# Patient Record
Sex: MALE | Race: AMERICAN INDIAN OR ALASKA NATIVE
[De-identification: names, ages, dates, MRNs, and addresses within clinical notes are randomized per-mention and may not be internally consistent; named-entity substitution may affect disease eponyms.]

---

## 2018-05-29 ENCOUNTER — HOSPITAL ENCOUNTER (OUTPATIENT)
Dept: HOSPITAL 56 - MW.ED | Age: 58
Setting detail: OBSERVATION
LOS: 1 days | Discharge: NURSING, REHAB OR LONG-TERM CARE | End: 2018-05-30
Attending: INTERNAL MEDICINE | Admitting: INTERNAL MEDICINE
Payer: COMMERCIAL

## 2018-05-29 DIAGNOSIS — I20.0: Primary | ICD-10-CM

## 2018-05-29 DIAGNOSIS — Z79.899: ICD-10-CM

## 2018-05-29 DIAGNOSIS — Z79.01: ICD-10-CM

## 2018-05-29 DIAGNOSIS — F41.9: ICD-10-CM

## 2018-05-29 DIAGNOSIS — I25.2: ICD-10-CM

## 2018-05-29 DIAGNOSIS — I25.10: ICD-10-CM

## 2018-05-29 DIAGNOSIS — Z79.82: ICD-10-CM

## 2018-05-29 DIAGNOSIS — I10: ICD-10-CM

## 2018-05-29 DIAGNOSIS — I16.0: ICD-10-CM

## 2018-05-29 DIAGNOSIS — E78.00: ICD-10-CM

## 2018-05-29 DIAGNOSIS — Z95.5: ICD-10-CM

## 2018-05-29 DIAGNOSIS — Z87.891: ICD-10-CM

## 2018-05-29 DIAGNOSIS — E78.5: ICD-10-CM

## 2018-05-29 DIAGNOSIS — E03.9: ICD-10-CM

## 2018-05-29 DIAGNOSIS — E66.9: ICD-10-CM

## 2018-05-29 LAB
CHLORIDE SERPL-SCNC: 107 MMOL/L (ref 98–107)
SODIUM SERPL-SCNC: 142 MMOL/L (ref 136–148)

## 2018-05-29 PROCEDURE — G0378 HOSPITAL OBSERVATION PER HR: HCPCS

## 2018-05-29 RX ADMIN — NITROGLYCERIN PRN MG: 0.4 TABLET SUBLINGUAL at 10:35

## 2018-05-29 RX ADMIN — NITROGLYCERIN ONE: 0.4 TABLET SUBLINGUAL at 10:43

## 2018-05-29 RX ADMIN — NITROGLYCERIN ONE MG: 0.4 TABLET SUBLINGUAL at 10:35

## 2018-05-29 RX ADMIN — NITROGLYCERIN PRN MG: 0.4 TABLET SUBLINGUAL at 10:44

## 2018-05-29 RX ADMIN — NITROGLYCERIN PRN MG: 0.4 TABLET SUBLINGUAL at 10:46

## 2018-05-29 NOTE — EDM.PDOC
ED HPI GENERAL MEDICAL PROBLEM





- General


Chief Complaint: Cardiovascular Problem


Stated Complaint: AMBULANCE


Time Seen by Provider: 05/29/18 10:37


Source of Information: Reports: Patient


History Limitations: Reports: No Limitations





- History of Present Illness


INITIAL COMMENTS - FREE TEXT/NARRATIVE: 


HISTORY AND PHYSICAL:


[]58-year-old male presents per EMS with concerns over diaphoresis weakness 

legs felt heavy the last 2 days now he had chest pressure/mental status





History of Present Illness:


[]Pleasant alert and oriented gentleman he is answering questions states that 

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


He did take his aspirin today and states that the ambulance crew gave him for 5 

tablets of aspirin to chew


Patient was diaphoretic on admission


Patient denies being diabetic he denies any difficulty chewing or swallowing 

denies have any cold fever or flulike symptoms


Denies having any diarrhea





Review of Systems:


As per history of present illness and below otherwise all 


systems reviewed and negative.  





Past medical history:


As per history of present illness and as reviewed below


otherwise noncontributory.





Surgical history:


As per history of present illness and as reviewed below


otherwise noncontributory.





Social history:


No reported history of drug or alcohol abuse.





Family history:


As per history of present illness and as reviewed below


otherwise noncontributory.





Physical exam:


Alert and oriented male answering questions appropriately in full sentences 

without shortness of breath. Nontoxic in appearance, is anxious.


HEENT: Atraumatic, normocehpalic, pupils reactive, negative for conjunctival 

pallor or scleral icterus, mucous membranes moist, throat clear, neck supple, 

nontender, trachea midline.  Panic membranes without erythema. Oral mucosa is 

moist.


Lungs: Clear to auscultation, breath sounds equal bilaterally, chest non 

tender.  


Heart: S1S2, regular, negative for clicks, rubs, or JVD.


Abdomen: Soft, nondistended, nontender.  Negative for masses or 

hepatossplenmegaly. Negative for costovertebral tenderness.


Pelvis: Stable nontender.


Genitourinary: Deferred.


Rectal: Deferred


Extremities: Atraumatic, negative for cords or calf pain.  


Neurovascular unremarkable.


Neuro:  Awake, alert, oriented.  Cranial nerves II through XII


unremarkable.  Cerebellum unremarkable.  Motor and sensory unremarkable 

throughout.  Exam nonfocal.  


Patient again is reporting feeling tingly having difficulty breathing. The 

pressure has elevated 180s over 110 second Nitrostat given and patient's blood 

pressure has reduced . Have place Nitrobid on his chest. Second EKG has 

improved slightly sinus rhythm 1 PVCs right bundle branch block.


Blood pressure continues to rise. Labetalol was ordered and reponse was blood 

pressure with diastolic 90's.


Have discussed this case with Dr. Allen who is agreeable for observation on 

telemetry.





Diagnostics:


[]CBC CMP troponin pace EKG oxygen chest x-ray





Therapeutics:


[]Nitrostat 0.4 sublingual X3


Nitrobid


Labetalol 20 IV


Telemetry





Impression:


[]Hypertension





Plan:


[]Refer to observation





Definitive disposition and diagnosis as appropriate pending


reevaluation and review of above.  





Onset: Today, Sudden


Location: Reports: Chest


Quality: Reports: Pressure


Severity: Moderate


Improves with: Reports: None


Worsens with: Reports: None


Associated Symptoms: Reports: Diaphoresis, Shortness of Breath, Weakness





- Related Data


 Allergies











Allergy/AdvReac Type Severity Reaction Status Date / Time


 


No Known Allergies Allergy   Verified 05/29/18 10:42














ED ROS GENERAL





- Review of Systems


Review Of Systems: ROS reveals no pertinent complaints other than HPI.





ED EXAM, GENERAL





- Physical Exam


Exam: See Below (See dictation)





Course





- Vital Signs


Last Recorded V/S: 


 Last Vital Signs











Temp  37.7 C   05/29/18 10:29


 


Pulse  85   05/29/18 11:33


 


Resp  16   05/29/18 11:33


 


BP  156/106 H  05/29/18 11:33


 


Pulse Ox  98   05/29/18 11:33














- Orders/Labs/Meds


Orders: 


 Active Orders 24 hr











 Category Date Time Status


 


 Patient Status [ADT] Stat ADT  05/29/18 12:08 Active


 


 Cardiac Monitoring [RC] .AS DIRECTED Care  05/29/18 10:35 Active


 


 EKG Documentation Completion [RC] STAT Care  05/29/18 10:35 Active


 


 EKG Documentation Completion [RC] STAT Care  05/29/18 10:47 Active


 


 Oxygen Therapy [RC] ASDIRECTED Care  05/29/18 10:35 Active


 


 Pulse Oximetry [RC] ASDIRECTED Care  05/29/18 10:35 Active


 


 Telemetry Monitoring [Cardiac Monitoring] [RC] .AS Care  05/29/18 12:09 Active





 DIRECTED   


 


 UA W/MICROSCOPIC [URIN] Stat Lab  05/29/18 10:32 Ordered


 


 Sodium Chloride 0.9% [Saline Flush] Med  05/29/18 10:35 Active





 10 ml FLUSH ASDIRECTED PRN   


 


 Sodium Chloride 0.9% [Saline Flush] Med  05/29/18 10:35 Active





 2.5 ml FLUSH ASDIRECTED PRN   


 


 Saline Lock Insert [OM.PC] Stat Oth  05/29/18 10:35 Ordered








 Medication Orders





Sodium Chloride (Saline Flush)  10 ml FLUSH ASDIRECTED PRN


   PRN Reason: Keep Vein Open


   Last Admin: 05/29/18 10:38  Dose: 10 ml


Sodium Chloride (Saline Flush)  2.5 ml FLUSH ASDIRECTED PRN


   PRN Reason: Keep Vein Open


   Last Admin: 05/29/18 10:38  Dose: 2.5 ml








Labs: 


 Laboratory Tests











  05/29/18 05/29/18 05/29/18 Range/Units





  10:32 10:45 10:45 


 


WBC   6.07   (4.0-11.0)  K/uL


 


RBC   4.91   (4.50-5.90)  M/uL


 


Hgb   15.0   (13.0-17.0)  g/dL


 


Hct   42.4   (38.0-50.0)  %


 


MCV   86.4   (80.0-98.0)  fL


 


MCH   30.5   (27.0-32.0)  pg


 


MCHC   35.4   (31.0-37.0)  g/dL


 


RDW Std Deviation   39.6   (28.0-62.0)  fl


 


RDW Coeff of Martha   13   (11.0-15.0)  %


 


Plt Count   188   (150-400)  K/uL


 


MPV   9.70   (7.40-12.00)  fL


 


Neut % (Auto)   73.4   (48.0-80.0)  %


 


Lymph % (Auto)   19.3   (16.0-40.0)  %


 


Mono % (Auto)   6.9   (0.0-15.0)  %


 


Eos % (Auto)   0.2   (0.0-7.0)  %


 


Baso % (Auto)   0.2   (0.0-1.5)  %


 


Neut # (Auto)   4.5   (1.4-5.7)  K/uL


 


Lymph # (Auto)   1.2   (0.6-2.4)  K/uL


 


Mono # (Auto)   0.4   (0.0-0.8)  K/uL


 


Eos # (Auto)   0.0   (0.0-0.7)  K/uL


 


Baso # (Auto)   0.0   (0.0-0.1)  K/uL


 


Nucleated RBC %   0.0   /100WBC


 


Nucleated RBCs #   0   K/uL


 


INR    1.18  


 


Sodium     (136-148)  mmol/L


 


Potassium     (3.5-5.1)  mmol/L


 


Chloride     ()  mmol/L


 


Carbon Dioxide     (21.0-32.0)  mmol/L


 


BUN     (7.0-18.0)  mg/dL


 


Creatinine     (0.8-1.3)  mg/dL


 


Est Cr Clr Drug Dosing     mL/min


 


Estimated GFR (MDRD)     ml/min


 


Glucose     ()  mg/dL


 


Calcium     (8.5-10.1)  mg/dL


 


Total Bilirubin     (0.2-1.0)  mg/dL


 


AST     (15-37)  IU/L


 


ALT     (14-63)  IU/L


 


Alkaline Phosphatase     ()  U/L


 


Troponin I     (0.000-0.056)  ng/mL


 


Total Protein     (6.4-8.2)  g/dL


 


Albumin     (3.4-5.0)  g/dL


 


Globulin     (2.0-3.5)  g/dL


 


Albumin/Globulin Ratio     (1.3-2.8)  


 


Amylase     ()  U/L


 


Lipase     ()  U/L


 


Urine Color  YELLOW    


 


Urine Appearance  CLEAR    


 


Urine pH  5.5    (5.0-8.0)  


 


Ur Specific Gravity  1.020    (1.001-1.035)  


 


Urine Protein  NEGATIVE    (NEGATIVE)  mg/dL


 


Urine Glucose (UA)  NEGATIVE    (NEGATIVE)  mg/dL


 


Urine Ketones  NEGATIVE    (NEGATIVE)  mg/dL


 


Urine Occult Blood  TRACE-INTACT    (NEGATIVE)  


 


Urine Nitrite  NEGATIVE    (NEGATIVE)  


 


Urine Bilirubin  NEGATIVE    (NEGATIVE)  


 


Urine Urobilinogen  0.2    (<2.0)  EU/dL


 


Ur Leukocyte Esterase  NEGATIVE    (NEGATIVE)  


 


Urine RBC  0-2    (0-2/HPF)  


 


Urine WBC  0-1    (0-5/HPF)  


 


Ur Epithelial Cells  RARE    (NONE-FEW)  


 


Amorphous Sediment  NOT SEEN    (NEGATIVE)  


 


Urine Bacteria  NOT SEEN    (NEGATIVE)  


 


Urine Mucus  NOT SEEN    (NONE-MOD)  














  05/29/18 Range/Units





  10:45 


 


WBC   (4.0-11.0)  K/uL


 


RBC   (4.50-5.90)  M/uL


 


Hgb   (13.0-17.0)  g/dL


 


Hct   (38.0-50.0)  %


 


MCV   (80.0-98.0)  fL


 


MCH   (27.0-32.0)  pg


 


MCHC   (31.0-37.0)  g/dL


 


RDW Std Deviation   (28.0-62.0)  fl


 


RDW Coeff of Martha   (11.0-15.0)  %


 


Plt Count   (150-400)  K/uL


 


MPV   (7.40-12.00)  fL


 


Neut % (Auto)   (48.0-80.0)  %


 


Lymph % (Auto)   (16.0-40.0)  %


 


Mono % (Auto)   (0.0-15.0)  %


 


Eos % (Auto)   (0.0-7.0)  %


 


Baso % (Auto)   (0.0-1.5)  %


 


Neut # (Auto)   (1.4-5.7)  K/uL


 


Lymph # (Auto)   (0.6-2.4)  K/uL


 


Mono # (Auto)   (0.0-0.8)  K/uL


 


Eos # (Auto)   (0.0-0.7)  K/uL


 


Baso # (Auto)   (0.0-0.1)  K/uL


 


Nucleated RBC %   /100WBC


 


Nucleated RBCs #   K/uL


 


INR   


 


Sodium  142  (136-148)  mmol/L


 


Potassium  4.2  (3.5-5.1)  mmol/L


 


Chloride  107  ()  mmol/L


 


Carbon Dioxide  24.7  (21.0-32.0)  mmol/L


 


BUN  12  (7.0-18.0)  mg/dL


 


Creatinine  1.3  (0.8-1.3)  mg/dL


 


Est Cr Clr Drug Dosing  62.69  mL/min


 


Estimated GFR (MDRD)  56.9  ml/min


 


Glucose  105  ()  mg/dL


 


Calcium  8.6  (8.5-10.1)  mg/dL


 


Total Bilirubin  0.9  (0.2-1.0)  mg/dL


 


AST  22  (15-37)  IU/L


 


ALT  42  (14-63)  IU/L


 


Alkaline Phosphatase  103  ()  U/L


 


Troponin I  < 0.050  (0.000-0.056)  ng/mL


 


Total Protein  6.7  (6.4-8.2)  g/dL


 


Albumin  3.5  (3.4-5.0)  g/dL


 


Globulin  3.2  (2.0-3.5)  g/dL


 


Albumin/Globulin Ratio  1.1 L  (1.3-2.8)  


 


Amylase  39  ()  U/L


 


Lipase  153  ()  U/L


 


Urine Color   


 


Urine Appearance   


 


Urine pH   (5.0-8.0)  


 


Ur Specific Gravity   (1.001-1.035)  


 


Urine Protein   (NEGATIVE)  mg/dL


 


Urine Glucose (UA)   (NEGATIVE)  mg/dL


 


Urine Ketones   (NEGATIVE)  mg/dL


 


Urine Occult Blood   (NEGATIVE)  


 


Urine Nitrite   (NEGATIVE)  


 


Urine Bilirubin   (NEGATIVE)  


 


Urine Urobilinogen   (<2.0)  EU/dL


 


Ur Leukocyte Esterase   (NEGATIVE)  


 


Urine RBC   (0-2/HPF)  


 


Urine WBC   (0-5/HPF)  


 


Ur Epithelial Cells   (NONE-FEW)  


 


Amorphous Sediment   (NEGATIVE)  


 


Urine Bacteria   (NEGATIVE)  


 


Urine Mucus   (NONE-MOD)  











Meds: 


Medications











Generic Name Dose Route Start Last Admin





  Trade Name Freq  PRN Reason Stop Dose Admin


 


Sodium Chloride  10 ml  05/29/18 10:35  05/29/18 10:38





  Saline Flush  FLUSH   10 ml





  ASDIRECTED PRN   Administration





  Keep Vein Open   





     





     





     


 


Sodium Chloride  2.5 ml  05/29/18 10:35  05/29/18 10:38





  Saline Flush  FLUSH   2.5 ml





  ASDIRECTED PRN   Administration





  Keep Vein Open   





     





     





     














Discontinued Medications














Generic Name Dose Route Start Last Admin





  Trade Name Freq  PRN Reason Stop Dose Admin


 


Sodium Chloride  1,000 mls @ 999 mls/hr  05/29/18 10:33  05/29/18 10:37





  Normal Saline  IV  05/29/18 11:33  999 mls/hr





  STAT ONE   Administration





     





     





     





     


 


Labetalol HCl  20 mg  05/29/18 11:01  05/29/18 11:07





  Normodyne  IVPUSH  05/29/18 11:02  20 mg





  NOW ONE   Administration





     





     





  Protocol   





     


 


Nitroglycerin  Confirm  05/29/18 10:33  05/29/18 10:43





  Nitrostat  Administered  05/29/18 10:34  Not Given





  Dose   





  0.4 mg   





  .ROUTE   





  .STK-MED ONE   





     





     





     





     


 


Nitroglycerin  0.4 mg  05/29/18 10:37  05/29/18 10:46





  Nitrostat  SL   0.4 mg





  Q5M PRN   Administration





  Chest Pain   





     





     





     


 


Nitroglycerin  1 gm  05/29/18 10:52  05/29/18 11:00





  Nitro-Bid 2%  TOP  05/29/18 10:53  1 gm





  ONETIME ONE   Administration





     





     





     





     














Departure





- Departure


Time of Disposition: 12:35


Disposition: Refer to Observation


Condition: Good


Clinical Impression: 


Hypertensive heart disease


Qualifiers:


 Heart failure presence: unspecified whether heart failure present Qualified 

Code(s): I11.9 - Hypertensive heart disease without heart failure





Forms:  ED Department Discharge





- My Orders


Last 24 Hours: 


My Active Orders





05/29/18 10:32


UA W/MICROSCOPIC [URIN] Stat 





05/29/18 10:35


Cardiac Monitoring [RC] .AS DIRECTED 


EKG Documentation Completion [RC] STAT 


Oxygen Therapy [RC] ASDIRECTED 


Pulse Oximetry [RC] ASDIRECTED 


Sodium Chloride 0.9% [Saline Flush]   10 ml FLUSH ASDIRECTED PRN 


Sodium Chloride 0.9% [Saline Flush]   2.5 ml FLUSH ASDIRECTED PRN 


Saline Lock Insert [OM.PC] Stat 





05/29/18 10:47


EKG Documentation Completion [RC] STAT 





05/29/18 12:08


Patient Status [ADT] Stat 





05/29/18 12:09


Telemetry Monitoring [Cardiac Monitoring] [RC] .AS DIRECTED 














- Assessment/Plan


Last 24 Hours: 


My Active Orders





05/29/18 10:32


UA W/MICROSCOPIC [URIN] Stat 





05/29/18 10:35


Cardiac Monitoring [RC] .AS DIRECTED 


EKG Documentation Completion [RC] STAT 


Oxygen Therapy [RC] ASDIRECTED 


Pulse Oximetry [RC] ASDIRECTED 


Sodium Chloride 0.9% [Saline Flush]   10 ml FLUSH ASDIRECTED PRN 


Sodium Chloride 0.9% [Saline Flush]   2.5 ml FLUSH ASDIRECTED PRN 


Saline Lock Insert [OM.PC] Stat 





05/29/18 10:47


EKG Documentation Completion [RC] STAT 





05/29/18 12:08


Patient Status [ADT] Stat 





05/29/18 12:09


Telemetry Monitoring [Cardiac Monitoring] [RC] .AS DIRECTED

## 2018-05-29 NOTE — PCM.HP
H&P History of Present Illness





- General


Date of Service: 05/29/18


Admit Problem/Dx: 


 Admission Diagnosis/Problem





Admission Diagnosis/Problem      Hypertensive crisis








Source of Information: Patient


History Limitations: Reports: No Limitations





- History of Present Illness


Initial Comments - Free Text/Narative: 





This 58 year old male with pmh of MI March 7, 2018, CAD and HTN presented to 

the ED today with concerns of not feeling right, tingling to his arms, sweating

, and midsternal chest pressure which radiated to his back. He reports on March 7th he started having back pain with shortness of breath and went to the ED. He 

was almost immediately in the cath lab and reports there was 100% blockage of a 

vessel, he is unsure which one, but reports its a big one and on the front of 

his heart. He reports having 5 stents placed. He was released and full released 

to return to work. He decided to come to [facility] over the summer and make 

some money. He has been here two weeks now. He reports he has felt well up 

until today. He does reports loss of endurance and stamina since MI, but 

otherwise feeling well. Today he was at work, outside wearing FR clothing, 

feeling fatigued, sweating, and "like I needed to eat and drink something." He 

started having lightheadedness, tingling to his arms and chest along with mild 

chest pressure with radiation to his back. He told his  he didn't feel 

well and he also noted he was heavily sweating.He reports he hasn't been eating 

or drinking much, due to low funds for food. he denies visual changes, headache

, fevers, chills or abdominal pain. No urinary symptoms and no diarrhea. He 

denies DM. He denies alcohol, tobacco or recreational drug use since 1985. 





In the ED labwork all WNL. Troponin negative. INR 1.18. UA negative CXR 

negative Head CT negative. BP on arrival was noted at 181/110, He was given 

Nitro, Nitro paste and labetolol, BP down to 150/100. He will be admitted for 

observation with hypertension and chest pain. 





- Related Data


Allergies/Adverse Reactions: 


 Allergies











Allergy/AdvReac Type Severity Reaction Status Date / Time


 


No Known Allergies Allergy   Verified 05/29/18 10:42











Home Medications: 


 Home Meds





Furosemide [Lasix] 20 mg PO DAILY 05/29/18 [History]


Metoprolol Succinate [Toprol XL] 12.5 mg PO DAILY 05/29/18 [History]


atorvaSTATin [Lipitor] 40 mg PO DAILY 05/29/18 [History]











Past Medical History


Cardiovascular History: Reports: CAD, High Cholesterol, Hypertension, MI, 

Stents.  Denies: Afib, Blood Clots/VTE/DVT


Respiratory History: Reports: None.  Denies: Asthma, COPD, PE


Gastrointestinal History: Reports: None


Musculoskeletal History: Reports: None


Psychiatric History: Reports: None


Endocrine/Metabolic History: Reports: Hypothyroidism, Obesity/BMI 30+.  Denies: 

Diabetes, Type II





- Infectious Disease History


Infectious Disease History: Reports: Mumps





- Past Surgical History


Musculoskeletal Surgical History: Reports: Arthroscopic Knee





Social & Family History





- Family History


Family Medical History: Noncontributory





- Tobacco Use


Smoking Status *Q: Never Smoker


Second Hand Smoke Exposure: No





- Caffeine Use


Caffeine Use: Reports: Coffee


Other Caffeine Use: used 24 oz of coffee today and normally doesn't use it.





- Recreational Drug Use


Recreational Drug Use: No





- Living Situation & Occupation


Occupation: Employed


Social History Comment: Here in [place] working, upon discharge plans on 

returning to [place] for good.





H&P Review of Systems





- Review of Systems:


Review Of Systems: See Below


General: Reports: Malaise, Weakness (generalized), Fatigue.  Denies: Fever, 

Chills


HEENT: Reports: No Symptoms.  Denies: Headaches, Sinus Congestion, Sore Throat, 

Vertigo, Visual Changes


Cardiovascular: Reports: Chest Pain, Lightheadedness.  Denies: Palpitations, 

Dyspnea on Exertion, Orthopnea, Edema, Syncope


Gastrointestinal: Reports: No Symptoms.  Denies: Abdominal Pain, Black Stool, 

Bloody Stool, Decreased Appetite, Nausea, Vomiting


Genitourinary: Reports: No Symptoms.  Denies: Dysuria, Frequency, Burning


Musculoskeletal: Reports: No Symptoms.  Denies: Neck Pain


Skin: Reports: No Symptoms


Psychiatric: Reports: Anxiety (regarding currently situation and not having 

much money. )


Neurological: Reports: No Symptoms


Hematologic/Lymphatic: Reports: No Symptoms


Immunologic: Reports: No Symptoms





Exam





- Exam


Exam: See Below





- Vital Signs


Vital Signs: 


 Last Vital Signs











Temp  99.9 F   05/29/18 10:29


 


Pulse  85   05/29/18 11:33


 


Resp  16   05/29/18 11:33


 


BP  156/106 H  05/29/18 11:33


 


Pulse Ox  98   05/29/18 11:33











Weight: 92.986 kg





- Exam


General: Alert, Oriented, Cooperative, Other (slightly clammy to the touch. 

reports feeling slightly diaphoretic.)


HEENT: Conjunctiva Clear, Mucosa Moist & Pink, Posterior Pharynx Clear


Neck: Supple, Trachea Midline, +2 Carotid Pulse wo Bruit, Full Range of Motion.

  No: JVD


Lungs: Clear to Auscultation, Normal Respiratory Effort


Cardiovascular: Regular Rate, Regular Rhythm, Other (currently chest pain free, 

no shortness of breath. ).  No: Systolic Murmur


GI/Abdominal Exam: Normal Bowel Sounds, Soft, Non-Tender, No Organomegaly, No 

Distention, No Abnormal Bruit, No Mass, Pelvis Stable


Back Exam: Normal Inspection, Full Range of Motion, NT


Extremities: Normal Inspection, Normal Range of Motion, Non-Tender, No Pedal 

Edema, Normal Capillary Refill


Neurological: Cranial Nerves Intact


Neuro Extensive - Mental Status: Alert, Oriented x3


Neuro Extensive - Motor, Sensory, Reflexes: CN II-XII Intact


Psychiatric: Alert, Normal Affect, Normal Mood





- Patient Data


Lab Results Last 24 hrs: 


 Laboratory Results - last 24 hr











  05/29/18 05/29/18 05/29/18 Range/Units





  10:32 10:45 10:45 


 


WBC   6.07   (4.0-11.0)  K/uL


 


RBC   4.91   (4.50-5.90)  M/uL


 


Hgb   15.0   (13.0-17.0)  g/dL


 


Hct   42.4   (38.0-50.0)  %


 


MCV   86.4   (80.0-98.0)  fL


 


MCH   30.5   (27.0-32.0)  pg


 


MCHC   35.4   (31.0-37.0)  g/dL


 


RDW Std Deviation   39.6   (28.0-62.0)  fl


 


RDW Coeff of Martha   13   (11.0-15.0)  %


 


Plt Count   188   (150-400)  K/uL


 


MPV   9.70   (7.40-12.00)  fL


 


Neut % (Auto)   73.4   (48.0-80.0)  %


 


Lymph % (Auto)   19.3   (16.0-40.0)  %


 


Mono % (Auto)   6.9   (0.0-15.0)  %


 


Eos % (Auto)   0.2   (0.0-7.0)  %


 


Baso % (Auto)   0.2   (0.0-1.5)  %


 


Neut # (Auto)   4.5   (1.4-5.7)  K/uL


 


Lymph # (Auto)   1.2   (0.6-2.4)  K/uL


 


Mono # (Auto)   0.4   (0.0-0.8)  K/uL


 


Eos # (Auto)   0.0   (0.0-0.7)  K/uL


 


Baso # (Auto)   0.0   (0.0-0.1)  K/uL


 


Nucleated RBC %   0.0   /100WBC


 


Nucleated RBCs #   0   K/uL


 


INR    1.18  


 


Sodium     (136-148)  mmol/L


 


Potassium     (3.5-5.1)  mmol/L


 


Chloride     ()  mmol/L


 


Carbon Dioxide     (21.0-32.0)  mmol/L


 


BUN     (7.0-18.0)  mg/dL


 


Creatinine     (0.8-1.3)  mg/dL


 


Est Cr Clr Drug Dosing     mL/min


 


Estimated GFR (MDRD)     ml/min


 


Glucose     ()  mg/dL


 


Calcium     (8.5-10.1)  mg/dL


 


Total Bilirubin     (0.2-1.0)  mg/dL


 


AST     (15-37)  IU/L


 


ALT     (14-63)  IU/L


 


Alkaline Phosphatase     ()  U/L


 


Troponin I     (0.000-0.056)  ng/mL


 


Total Protein     (6.4-8.2)  g/dL


 


Albumin     (3.4-5.0)  g/dL


 


Globulin     (2.0-3.5)  g/dL


 


Albumin/Globulin Ratio     (1.3-2.8)  


 


Amylase     ()  U/L


 


Lipase     ()  U/L


 


Urine Color  YELLOW    


 


Urine Appearance  CLEAR    


 


Urine pH  5.5    (5.0-8.0)  


 


Ur Specific Gravity  1.020    (1.001-1.035)  


 


Urine Protein  NEGATIVE    (NEGATIVE)  mg/dL


 


Urine Glucose (UA)  NEGATIVE    (NEGATIVE)  mg/dL


 


Urine Ketones  NEGATIVE    (NEGATIVE)  mg/dL


 


Urine Occult Blood  TRACE-INTACT    (NEGATIVE)  


 


Urine Nitrite  NEGATIVE    (NEGATIVE)  


 


Urine Bilirubin  NEGATIVE    (NEGATIVE)  


 


Urine Urobilinogen  0.2    (<2.0)  EU/dL


 


Ur Leukocyte Esterase  NEGATIVE    (NEGATIVE)  


 


Urine RBC  0-2    (0-2/HPF)  


 


Urine WBC  0-1    (0-5/HPF)  


 


Ur Epithelial Cells  RARE    (NONE-FEW)  


 


Amorphous Sediment  NOT SEEN    (NEGATIVE)  


 


Urine Bacteria  NOT SEEN    (NEGATIVE)  


 


Urine Mucus  NOT SEEN    (NONE-MOD)  














  05/29/18 Range/Units





  10:45 


 


WBC   (4.0-11.0)  K/uL


 


RBC   (4.50-5.90)  M/uL


 


Hgb   (13.0-17.0)  g/dL


 


Hct   (38.0-50.0)  %


 


MCV   (80.0-98.0)  fL


 


MCH   (27.0-32.0)  pg


 


MCHC   (31.0-37.0)  g/dL


 


RDW Std Deviation   (28.0-62.0)  fl


 


RDW Coeff of Martha   (11.0-15.0)  %


 


Plt Count   (150-400)  K/uL


 


MPV   (7.40-12.00)  fL


 


Neut % (Auto)   (48.0-80.0)  %


 


Lymph % (Auto)   (16.0-40.0)  %


 


Mono % (Auto)   (0.0-15.0)  %


 


Eos % (Auto)   (0.0-7.0)  %


 


Baso % (Auto)   (0.0-1.5)  %


 


Neut # (Auto)   (1.4-5.7)  K/uL


 


Lymph # (Auto)   (0.6-2.4)  K/uL


 


Mono # (Auto)   (0.0-0.8)  K/uL


 


Eos # (Auto)   (0.0-0.7)  K/uL


 


Baso # (Auto)   (0.0-0.1)  K/uL


 


Nucleated RBC %   /100WBC


 


Nucleated RBCs #   K/uL


 


INR   


 


Sodium  142  (136-148)  mmol/L


 


Potassium  4.2  (3.5-5.1)  mmol/L


 


Chloride  107  ()  mmol/L


 


Carbon Dioxide  24.7  (21.0-32.0)  mmol/L


 


BUN  12  (7.0-18.0)  mg/dL


 


Creatinine  1.3  (0.8-1.3)  mg/dL


 


Est Cr Clr Drug Dosing  62.69  mL/min


 


Estimated GFR (MDRD)  56.9  ml/min


 


Glucose  105  ()  mg/dL


 


Calcium  8.6  (8.5-10.1)  mg/dL


 


Total Bilirubin  0.9  (0.2-1.0)  mg/dL


 


AST  22  (15-37)  IU/L


 


ALT  42  (14-63)  IU/L


 


Alkaline Phosphatase  103  ()  U/L


 


Troponin I  < 0.050  (0.000-0.056)  ng/mL


 


Total Protein  6.7  (6.4-8.2)  g/dL


 


Albumin  3.5  (3.4-5.0)  g/dL


 


Globulin  3.2  (2.0-3.5)  g/dL


 


Albumin/Globulin Ratio  1.1 L  (1.3-2.8)  


 


Amylase  39  ()  U/L


 


Lipase  153  ()  U/L


 


Urine Color   


 


Urine Appearance   


 


Urine pH   (5.0-8.0)  


 


Ur Specific Gravity   (1.001-1.035)  


 


Urine Protein   (NEGATIVE)  mg/dL


 


Urine Glucose (UA)   (NEGATIVE)  mg/dL


 


Urine Ketones   (NEGATIVE)  mg/dL


 


Urine Occult Blood   (NEGATIVE)  


 


Urine Nitrite   (NEGATIVE)  


 


Urine Bilirubin   (NEGATIVE)  


 


Urine Urobilinogen   (<2.0)  EU/dL


 


Ur Leukocyte Esterase   (NEGATIVE)  


 


Urine RBC   (0-2/HPF)  


 


Urine WBC   (0-5/HPF)  


 


Ur Epithelial Cells   (NONE-FEW)  


 


Amorphous Sediment   (NEGATIVE)  


 


Urine Bacteria   (NEGATIVE)  


 


Urine Mucus   (NONE-MOD)  











Result Diagrams: 


 05/29/18 10:45





 05/29/18 10:45





EKG INTERPRETATION


EKG Date: 05/29/18


Rhythm: NSR


Rate (Beats/Min): 86


P-Wave: Present


QRS: RBBB


ST-T: Normal


QT: Normal


Comparison: NA - No Prior EKG





*Q Meaningful Use (ADM)





- VTE Risk Assess *Q


Each Risk Factor Represents 1 Point: Age 41 - 59 years, Obesity ( BMI > 25 kg/m2

)


Total Score 1 Point Risk Factors: 2


Each Risk Factor Represents 2 Points: None


Total Score 2 Point Risk Factors: 0


Each Risk Factor Represents 3 Points: None


Total Score 3 Point Risk Factors: 0


Each Risk Factor Represents 5 Points: None


Total Score 5 Point Risk Factors: 0


Venous Thromboembolism Risk Factor Score *Q: 2





- Problem List


(1) Chest pain


SNOMED Code(s): 41968198


   ICD Code: R07.9 - CHEST PAIN, UNSPECIFIED   Status: Acute   Current Visit: 

Yes   





(2) Hypertensive urgency


SNOMED Code(s): 463081644


   ICD Code: I16.0 - HYPERTENSIVE URGENCY   Status: Acute   Current Visit: Yes 

  





(3) HTN (hypertension)


SNOMED Code(s): 63042441


   ICD Code: I10 - ESSENTIAL (PRIMARY) HYPERTENSION   Status: Chronic   Current 

Visit: Yes   


Qualifiers: 


   Hypertension type: essential hypertension   Qualified Code(s): I10 - 

Essential (primary) hypertension   





(4) CAD (coronary artery disease)


SNOMED Code(s): 82474500


   ICD Code: I25.10 - ATHSCL HEART DISEASE OF NATIVE CORONARY ARTERY W/O ANG 

PCTRS   Status: Chronic   Current Visit: Yes   


Qualifiers: 


   Coronary Disease-Associated Artery/Lesion type: native artery   Native vs. 

transplanted heart: native heart   Associated angina: without angina   

Qualified Code(s): I25.10 - Atherosclerotic heart disease of native coronary 

artery without angina pectoris   





(5) Hx of myocardial infarction


SNOMED Code(s): 368192875


   ICD Code: I25.2 - OLD MYOCARDIAL INFARCTION   Status: Chronic   Current Visit

: Yes   





(6) Hx of heart artery stent


SNOMED Code(s): 807323163, 839182675


   ICD Code: Z95.5 - PRESENCE OF CORONARY ANGIOPLASTY IMPLANT AND GRAFT   Status

: Chronic   Current Visit: Yes   





(7) Hypercholesterolemia


SNOMED Code(s): 19893288


   ICD Code: E78.00 - PURE HYPERCHOLESTEROLEMIA, UNSPECIFIED   Status: Chronic 

  Current Visit: Yes   





(8) Hypothyroidism


SNOMED Code(s): 41886816


   ICD Code: E03.9 - HYPOTHYROIDISM, UNSPECIFIED   Status: Chronic   Current 

Visit: Yes   


Qualifiers: 


   Hypothyroidism type: unspecified   Qualified Code(s): E03.9 - Hypothyroidism

, unspecified   


Problem List Initiated/Reviewed/Updated: Yes


Orders Last 24hrs: 


 Active Orders 24 hr











 Category Date Time Status


 


 Patient Status [ADT] Stat ADT  05/29/18 12:08 Active


 


 Cardiac Monitoring [RC] .AS DIRECTED Care  05/29/18 10:35 Active


 


 Communication Order [RC] ROUTINE Care  05/29/18 13:09 Ordered


 


 EKG Documentation Completion [RC] STAT Care  05/29/18 10:35 Active


 


 EKG Documentation Completion [RC] STAT Care  05/29/18 10:47 Active


 


 Intake and Output [RC] QSHIFT Care  05/29/18 13:00 Ordered


 


 Notify Provider Consults [RC] ASDIRECTED Care  05/29/18 12:59 Active


 


 Oxygen Therapy [RC] ASDIRECTED Care  05/29/18 10:35 Active


 


 Oxygen Therapy [RC] PRN Care  05/29/18 13:00 Ordered


 


 Pulse Oximetry [RC] ASDIRECTED Care  05/29/18 10:35 Active


 


 Telemetry Monitoring [Cardiac Monitoring] [RC] .AS Care  05/29/18 12:09 Active





 DIRECTED   


 


 Telemetry Monitoring [Cardiac Monitoring] [RC] .AS Care  05/29/18 13:00 Ordered





 DIRECTED   


 


 Up With Assistance [RC] ASDIRECTED Care  05/29/18 13:00 Ordered


 


 VTE/DVT Education [RC] PER UNIT ROUTINE Care  05/29/18 13:00 Ordered


 


 Vital Signs [RC] Q4H Care  05/29/18 13:00 Ordered


 


 Consult to Physician [CONS] Routine Cons  05/29/18 12:59 Active


 


 Heart Healthy Diet [DIET] Diet  05/29/18 Lunch Ordered


 


 TROPONIN I [CHEM] Q6H Lab  05/29/18 16:45 Ordered


 


 TROPONIN I [CHEM] Q6H Lab  05/29/18 22:45 Ordered


 


 UA W/MICROSCOPIC [URIN] Stat Lab  05/29/18 10:32 Ordered


 


 Acetaminophen [Tylenol] Med  05/29/18 13:00 Ordered





 650 mg PO Q4H PRN   


 


 Enoxaparin [Lovenox] Med  05/29/18 13:00 Ordered





 40 mg SUBCUT Q24H   


 


 Ondansetron [Zofran ODT] Med  05/29/18 13:00 Ordered





 4 mg PO Q4H PRN   


 


 Sodium Chloride 0.9% [Saline Flush] Med  05/29/18 10:35 Active





 10 ml FLUSH ASDIRECTED PRN   


 


 Sodium Chloride 0.9% [Saline Flush] Med  05/29/18 10:35 Active





 2.5 ml FLUSH ASDIRECTED PRN   


 


 Saline Lock Insert [OM.PC] Stat Oth  05/29/18 10:35 Ordered


 


 Resuscitation Status Routine Resus Stat  05/29/18 13:00 Ordered








 Medication Orders





Acetaminophen (Tylenol)  650 mg PO Q4H PRN


   PRN Reason: Pain (mild 1-3)


Enoxaparin Sodium (Lovenox)  40 mg SUBCUT Q24H [place]


Ondansetron HCl (Zofran Odt)  4 mg PO Q4H PRN


   PRN Reason: nausea, able to take PO


Sodium Chloride (Saline Flush)  10 ml FLUSH ASDIRECTED PRN


   PRN Reason: Keep Vein Open


   Last Admin: 05/29/18 10:38  Dose: 10 ml


Sodium Chloride (Saline Flush)  2.5 ml FLUSH ASDIRECTED PRN


   PRN Reason: Keep Vein Open


   Last Admin: 05/29/18 10:38  Dose: 2.5 ml








Assessment/Plan Comment:: 





This 58 year old male admitted with chest pain and hypertensive urgency





1. Chest pain: Now improved. Nitro paste in place. Will trend troponins and 

monitor on telemetry. Consult with Dr Siegel due to recent MI and 

stenting. Awaiting records from [facility] in [facility]. 





2. Hypertensive urgency: BP has improved slightly to 150/100s. Will monitor. 

Took all home medications today. Awaiting list. May need to make adjustments. 





3. CAD: Continue Statin, ASA and Plavix.





VTE prophylaxis: Lovenox.





Dispo: 1-2 days pending improvement.

## 2018-05-29 NOTE — CONS
DATE OF CONSULTATION:

 

 

YOB: 1960

 

PRIMARY CARE PHYSICIAN:

None PCP

 

REASON FOR CONSULTATION:

Chest pain.

 

HISTORY OF PRESENT ILLNESS:

This is a 58-year-old male, history of hypertension, hyperlipidemia, former

smoking, who recently had an MI, status post PCI with 5 stents in March 2018.

He lives increase [place], [place].  At that time, he had a history of chest

pain and sweating, shortness of breath.  He then was brought to an emergency

room.  When he was there, he was found to have a blockage and he receives 5

stents in one artery.  He was not told that he has a blockage in the other

arteries however and then he just got relieved for a job recently.  He has been

visiting [place] for 2-1/2 weeks, and when he finishes his job here, he

will go back to [place].  However, this morning around 3:30 a.m. when he was

about to start working, he started having similar symptoms like sweating, chest

heaviness on the retrosternal area, no radiation, tingling, complaining

shortness of breath while sitting.  It lasted for 45 minutes, and then they

called 911.  He was brought to the emergency room.  In the emergency room, his

blood pressure initially was davy high to 181/111.  He got a nitroglycerin patch

as well as nitroglycerin pill.  Currently, the blood pressure is down to 150/73.

He is currently chest pain-free now.

 

PAST MEDICAL HISTORY:

Including recent heart attack, received cardiac stent of 5 stents in March 2018;

however, the record is still unavailable.

 

MEDICATIONS:

The list of medications, the patient did not remember the list and the record

from pharmacy is still pending as well.  Medications pending, but he stated that

he takes Plavix as well as aspirin.  The list of medications is not available,

and awaiting for pharmacy record.

 

ALLERGIES:

No known drug allergies.

 

SOCIAL HISTORY:

He was former smoker.  No drug use.  No alcoholic consumption.

 

FAMILY HISTORY:

Denies history of CAD in his family.

 

PHYSICAL EXAMINATION:

VITAL SIGNS:  The initial blood pressure was 181/111, heart rate of 79, and O2

saturation 98% on room air, respirations 18, temperature is 36.7.

HEENT:  No pallor.  No jaundice.  No JVD.

HEART:  Normal S1 and S2.  No murmur.

LUNGS:  Are clear.  No wheezing.  No crackles.

ABDOMEN:  Soft and nontender.  Bowel sounds are present.  No hepatosplenomegaly.

No rebound tenderness.

EXTREMITIES:  Legs, no edema.

 

INVESTIGATIONS:

EKG shows right bundle-branch block pattern with ST abnormality in leads III and

AVF.  However, there is no previous EKG to compare.

 

Prelim echo showed preserved ejection fraction.  I could not appreciate any wall

motion abnormalities, and there is no significant valvular heart disease.  He

has a grade 1 diastolic dysfunction.

 

WBC 6, hematocrit of 42, hemoglobin of 15, platelets 188.  INR is 1.1.  Sodium

142, __________ 4.2, chloride 107, bicarb 24, BUN 12, creatinine 1.3.  Troponin,

1st one is negative.  Urinalysis is negative.

 

ASSESSMENT AND PLAN:

This is a 58-year-old male who had a history of recent heart attack, received 5

stents in March 2018, history of hypertension, history of hyperlipidemia, former

smoker, presented to the hospital with chest heaviness __________ with a concern

of a cardiac angina.  He will be admitted in the hospital, and he need to be

worked up for ACS and a cardiac enzymes need to be cycled.  Currently, he is

chest pain-free and he is on nitroglycerin patch.  I will keep him on that and

with the recent heart stent as well as a similar chest pain that he had when he

had a heart attack.  I have a concern that one of his stents may be blocked off.

He needs ischemic workup, but I am leaning toward more repeat the angiogram to

make sure the stent is still widely patent before he got discharged from the

hospital.  I will treat him as an unstable angina.  I will start him on a

heparin IV drip for ACS protocol and then cycle cardiac enzymes.  EKG needs to

be repeated in the morning, and the patient wanted to talk to his wife regarding

the transfer because he is concerned about the expenses.  Currently he is chest

pain-free now.  The transfer will be arranged in the morning.

 

 

EWA HUTCHINS

DD:  05/29/2018 18:02:08

DT:  05/29/2018 19:08:46

Job #:  [id number]

## 2018-05-29 NOTE — CT
EXAMINATION:  Non contrast CT head. Coronal and sagittal reformats.

 

HISTORY: Pain

 

FINDINGS:  

No evidence of intra or extra axial hemorrhage, mass,  midline shift, hydrocephalus or edema.

 

No hypoattenuation changes in the major vascular territories to suggest acute infarct.  

 

No abnormal intracranial calcifications are detected.  No evidence of substantial vascular calcificat
ions.

 

Paranasal sinuses and mastoid air cells are well aerated without substantial findings.  

 

Pituitary fossa appears unremarkable.

 

Calvarium is intact. No evidence of skull fracture. 

 

IMPRESSION: 

No acute intracranial findings.

## 2018-05-29 NOTE — CR
EXAMINATION: Portable chest radiograph.

 

HISTORY: Chest pressure.

 

FINDINGS: 

The trachea is midline. The cardiomediastinal silhouette is within normal limits. No pulmonary infilt
rates, effusions or pneumothorax.

 

Osseous structures appear unremarkable.

 

IMPRESSION: 

No acute cardiopulmonary process.

## 2018-05-30 NOTE — [id number]
**Discharge Summary





- Hospital Course


Brief History: This 58 year old male with pmh of MI March 7, 2018, CAD and HTN 

presented to the ED today with concerns of not feeling right, tingling to his 

arms, sweating, and midsternal chest pressure which radiated to his back. He 

reports on March 7th he started having back pain with shortness of breath and 

went to the ED. He was almost immediately in the cath lab and reports there was 

100% blockage of a vessel, he is unsure which one, but reports its a big one 

and on the front of his heart. He reports having 5 stents placed. He was 

released and full released to return to work. He decided to come to [facility] over the summer and make some money. He has been here two weeks now. He 

reports he has felt well up until today. He does reports loss of endurance and 

stamina since MI, but otherwise feeling well. Today he was at work, outside 

wearing FR clothing, feeling fatigued, sweating, and "like I needed to eat and 

drink something." He started having lightheadedness, tingling to his arms and 

chest along with mild chest pressure with radiation to his back. He told his 

 he didn't feel well and he also noted he was heavily sweating.He 

reports he hasn't been eating or drinking much, due to low funds for food. he 

denies visual changes, headache, fevers, chills or abdominal pain. No urinary 

symptoms and no diarrhea. He denies DM. He denies alcohol, tobacco or 

recreational drug use since 1985.  In the ED labwork all WNL. Troponin 

negative. INR 1.18. UA negative CXR negative Head CT negative. BP on arrival 

was noted at 181/110, He was given Nitro, Nitro paste and labetolol, BP down to 

150/100. He will be admitted for observation with hypertension and chest pain.





- Discharge Data


Discharge Date: 05/30/18


Discharge Disposition: DC/Tfer to Acute Hospital 02


Condition: Fair





- Discharge Diagnosis/Problem(s)


(1) Chest pain


SNOMED Code(s): 78972625


   ICD Code: R07.9 - CHEST PAIN, UNSPECIFIED   Status: Acute   Current Visit: 

Yes   





(2) Hypertensive urgency


SNOMED Code(s): 841762306


   ICD Code: I16.0 - HYPERTENSIVE URGENCY   Status: Acute   Current Visit: Yes 

  





(3) HTN (hypertension)


SNOMED Code(s): 39651898


   ICD Code: I10 - ESSENTIAL (PRIMARY) HYPERTENSION   Status: Chronic   Current 

Visit: Yes   


Qualifiers: 


   Hypertension type: essential hypertension   Qualified Code(s): I10 - 

Essential (primary) hypertension   





(4) CAD (coronary artery disease)


SNOMED Code(s): 23285318


   ICD Code: I25.10 - ATHSCL HEART DISEASE OF NATIVE CORONARY ARTERY W/O ANG 

PCTRS   Status: Chronic   Current Visit: Yes   


Qualifiers: 


   Coronary Disease-Associated Artery/Lesion type: native artery   Native vs. 

transplanted heart: native heart   Associated angina: without angina   

Qualified Code(s): I25.10 - Atherosclerotic heart disease of native coronary 

artery without angina pectoris   





(5) Hx of myocardial infarction


SNOMED Code(s): 893774739


   ICD Code: I25.2 - OLD MYOCARDIAL INFARCTION   Status: Chronic   Current Visit

: Yes   





(6) Hx of heart artery stent


SNOMED Code(s): 658887529, 190363288


   ICD Code: Z95.5 - PRESENCE OF CORONARY ANGIOPLASTY IMPLANT AND GRAFT   Status

: Chronic   Current Visit: Yes   





(7) Hypercholesterolemia


SNOMED Code(s): 06885081


   ICD Code: E78.00 - PURE HYPERCHOLESTEROLEMIA, UNSPECIFIED   Status: Chronic 

  Current Visit: Yes   





(8) Hypothyroidism


SNOMED Code(s): 56810270


   ICD Code: E03.9 - HYPOTHYROIDISM, UNSPECIFIED   Status: Chronic   Current 

Visit: Yes   


Qualifiers: 


   Hypothyroidism type: unspecified   Qualified Code(s): E03.9 - Hypothyroidism

, unspecified   





- Patient Summary/Data


Consults: 


 Consultations





05/29/18 12:59


Consult to Physician [CONS] Routine 














- Patient Instructions


Diet: NPO


Activity: No Strenuous Activities





- Discharge Plan


Home Medications: 


 Home Meds





Aspirin [Halfprin] 81 mg PO DAILY 05/29/18 [History]


Clopidogrel [Plavix] 75 mg PO DAILY 05/29/18 [History]


Ezetimibe 10 mg PO DAILY 05/29/18 [History]


Furosemide [Lasix] 20 mg PO DAILY 05/29/18 [History]


Levothyroxine [Synthroid] 88 mcg PO ACBREAKFAST 05/29/18 [History]


Metoprolol Succinate [Toprol XL] 12.5 mg PO DAILY 05/29/18 [History]


Patient's Own Medication [Ptom] 1 tab PO DAILY 05/29/18 [History]


Potassium Chloride 10 meq PO BID 05/29/18 [History]


atorvaSTATin [Lipitor] 40 mg PO DAILY 05/29/18 [History]


Heparin Sodium 1,000 - 2,000 units IV Q6H PRN  vial 05/30/18 [Rx]








Referrals: 


PCP,None [Primary Care Provider] - 





- Discharge Summary/Plan Comment


DC Time >30 min.: Yes (more than 1 hr spent discussing transfer with patient 

and consults.)


Discharge Summary/Plan Comment: 





Discharge Diagnoses:





Unstable Angina





Hx STEMI with PCI x 5 stents to RCA 3/7/2018


30-40% LAD stenosis


HTN


CAD


Hypercholesterolemia


Anxiety


Hypothyroidism   





Ricardo was admitted with concerns of chest pain and elevated blood pressure. 

Nitrobid was placed, which relieved chest pain and lowered BP to 130-140/80-

90s. Troponins remained negative. Dr Siegel consulted yesterday on 

admission secondary to recent STEMI with 5 drug eluding stents placed to RCA on 

March 7th 2018 in [place], [place]. Records reviewed from [facility] in [place], [place]. Complete obstruction of RCA noted, 5 stents placed, LAD noted to have 30-40

% stenosis  There is concern for unstable angina and the need for urgent 

ischemic work up including stress test and possible angiogram prior to safe 

discharge home. Around 0700 this morning, Dr Siegel and myself discussed 

transfer with patient for over 30 minutes and at that time patient was 

declining transfer, stating "I just want to go home." Meaning he wanted to be 

discharged so he would be able to drive with family back to [facility] and 

received medical care there. We discussed with him the risks of this including 

significant cardiac impairment and even death, with he verbally understood and 

continued to want discharge, but was willing to stay for 48 hours to have had 

Heparin therapy for that time, per recommendation of Dr. Siegel, 

Cardiology. 





Wife arrived and Ricardo had time to talk with family and his [employer] 

back in [place] regarding payment of this hospital stay and transfer. He now is 

wanting transfer for further evaluation and ischemic workup. Dr Allen present 

for this conversation. 





He currently is on Heprain drip and has received all home medications at 0200, 

which is when he takes them. Including ASA and Plavix. 





He reports slight chest discomfort this morning. Troponins remain negative. 





Continues to confirm DNR status, wife in room and witnessed this as well along 

with Dr Allen. But he is wanting intervention currently with further workup and 

angiogram. Dr Siegel contacted Dr Herrera, Cardiologist at [facility] 

in [facility]. He has kindly accepted patient to tele bed for transfer. Will 

arrange ground transfer at this time. Patient aware and continues to agree with 

treatment plan. 











- General Info


Date of Service: 05/30/18


Admission Dx/Problem (Free Text: 


 Admission Diagnosis/Problem





Admission Diagnosis/Problem      Hypertensive crisis











- Review of Systems


General: Reports: No Symptoms.  Denies: Fever, Weakness, Fatigue, Malaise


HEENT: Reports: No Symptoms.  Denies: Headaches, Sore Throat, Visual Changes


Pulmonary: Denies: Shortness of Breath, Cough, Sputum


Cardiovascular: Reports: Chest Pain (slight discomfort, midsternally this am.)


Gastrointestinal: Reports: No Symptoms.  Denies: Abdominal Pain, Nausea, 

Vomiting


Genitourinary: Reports: No Symptoms.  Denies: Dysuria, Frequency, Burning


Musculoskeletal: Reports: No Symptoms


Neurological: Reports: No Symptoms


Psychiatric: Reports: No Symptoms





- Patient Data


Vitals - Most Recent: 


 Last Vital Signs











Temp  98.6 F   05/30/18 04:00


 


Pulse  70   05/30/18 04:00


 


Resp  18   05/30/18 04:00


 


BP  135/88   05/30/18 04:00


 


Pulse Ox  95   05/30/18 04:00











Weight - Most Recent: 92.986 kg


I&O - Last 24 hours: 


 Intake & Output











 05/29/18 05/30/18 05/30/18





 22:59 06:59 14:59


 


Intake Total  150 


 


Output Total  1400 


 


Balance  -1250 











Lab Results - Last 24 hrs: 


 Laboratory Results - last 24 hr











  05/29/18 05/29/18 05/29/18 Range/Units





  10:32 10:45 10:45 


 


WBC   6.07   (4.0-11.0)  K/uL


 


RBC   4.91   (4.50-5.90)  M/uL


 


Hgb   15.0   (13.0-17.0)  g/dL


 


Hct   42.4   (38.0-50.0)  %


 


MCV   86.4   (80.0-98.0)  fL


 


MCH   30.5   (27.0-32.0)  pg


 


MCHC   35.4   (31.0-37.0)  g/dL


 


RDW Std Deviation   39.6   (28.0-62.0)  fl


 


RDW Coeff of Martha   13   (11.0-15.0)  %


 


Plt Count   188   (150-400)  K/uL


 


MPV   9.70   (7.40-12.00)  fL


 


Neut % (Auto)   73.4   (48.0-80.0)  %


 


Lymph % (Auto)   19.3   (16.0-40.0)  %


 


Mono % (Auto)   6.9   (0.0-15.0)  %


 


Eos % (Auto)   0.2   (0.0-7.0)  %


 


Baso % (Auto)   0.2   (0.0-1.5)  %


 


Neut # (Auto)   4.5   (1.4-5.7)  K/uL


 


Lymph # (Auto)   1.2   (0.6-2.4)  K/uL


 


Mono # (Auto)   0.4   (0.0-0.8)  K/uL


 


Eos # (Auto)   0.0   (0.0-0.7)  K/uL


 


Baso # (Auto)   0.0   (0.0-0.1)  K/uL


 


Nucleated RBC %   0.0   /100WBC


 


Nucleated RBCs #   0   K/uL


 


INR    1.18  


 


APTT     (18.6-31.3)  SEC


 


Sodium     (136-148)  mmol/L


 


Potassium     (3.5-5.1)  mmol/L


 


Chloride     ()  mmol/L


 


Carbon Dioxide     (21.0-32.0)  mmol/L


 


BUN     (7.0-18.0)  mg/dL


 


Creatinine     (0.8-1.3)  mg/dL


 


Est Cr Clr Drug Dosing     mL/min


 


Estimated GFR (MDRD)     ml/min


 


Glucose     ()  mg/dL


 


POC Glucose     ()  mg/dL


 


Calcium     (8.5-10.1)  mg/dL


 


Total Bilirubin     (0.2-1.0)  mg/dL


 


AST     (15-37)  IU/L


 


ALT     (14-63)  IU/L


 


Alkaline Phosphatase     ()  U/L


 


Troponin I     (0.000-0.056)  ng/mL


 


Total Protein     (6.4-8.2)  g/dL


 


Albumin     (3.4-5.0)  g/dL


 


Globulin     (2.0-3.5)  g/dL


 


Albumin/Globulin Ratio     (1.3-2.8)  


 


Amylase     ()  U/L


 


Lipase     ()  U/L


 


Urine Color  YELLOW    


 


Urine Appearance  CLEAR    


 


Urine pH  5.5    (5.0-8.0)  


 


Ur Specific Gravity  1.020    (1.001-1.035)  


 


Urine Protein  NEGATIVE    (NEGATIVE)  mg/dL


 


Urine Glucose (UA)  NEGATIVE    (NEGATIVE)  mg/dL


 


Urine Ketones  NEGATIVE    (NEGATIVE)  mg/dL


 


Urine Occult Blood  TRACE-INTACT    (NEGATIVE)  


 


Urine Nitrite  NEGATIVE    (NEGATIVE)  


 


Urine Bilirubin  NEGATIVE    (NEGATIVE)  


 


Urine Urobilinogen  0.2    (<2.0)  EU/dL


 


Ur Leukocyte Esterase  NEGATIVE    (NEGATIVE)  


 


Urine RBC  0-2    (0-2/HPF)  


 


Urine WBC  0-1    (0-5/HPF)  


 


Ur Epithelial Cells  RARE    (NONE-FEW)  


 


Amorphous Sediment  NOT SEEN    (NEGATIVE)  


 


Urine Bacteria  NOT SEEN    (NEGATIVE)  


 


Urine Mucus  NOT SEEN    (NONE-MOD)  














  05/29/18 05/29/18 05/29/18 Range/Units





  10:45 15:28 17:33 


 


WBC     (4.0-11.0)  K/uL


 


RBC     (4.50-5.90)  M/uL


 


Hgb     (13.0-17.0)  g/dL


 


Hct     (38.0-50.0)  %


 


MCV     (80.0-98.0)  fL


 


MCH     (27.0-32.0)  pg


 


MCHC     (31.0-37.0)  g/dL


 


RDW Std Deviation     (28.0-62.0)  fl


 


RDW Coeff of Martha     (11.0-15.0)  %


 


Plt Count     (150-400)  K/uL


 


MPV     (7.40-12.00)  fL


 


Neut % (Auto)     (48.0-80.0)  %


 


Lymph % (Auto)     (16.0-40.0)  %


 


Mono % (Auto)     (0.0-15.0)  %


 


Eos % (Auto)     (0.0-7.0)  %


 


Baso % (Auto)     (0.0-1.5)  %


 


Neut # (Auto)     (1.4-5.7)  K/uL


 


Lymph # (Auto)     (0.6-2.4)  K/uL


 


Mono # (Auto)     (0.0-0.8)  K/uL


 


Eos # (Auto)     (0.0-0.7)  K/uL


 


Baso # (Auto)     (0.0-0.1)  K/uL


 


Nucleated RBC %     /100WBC


 


Nucleated RBCs #     K/uL


 


INR     


 


APTT     (18.6-31.3)  SEC


 


Sodium  142    (136-148)  mmol/L


 


Potassium  4.2    (3.5-5.1)  mmol/L


 


Chloride  107    ()  mmol/L


 


Carbon Dioxide  24.7    (21.0-32.0)  mmol/L


 


BUN  12    (7.0-18.0)  mg/dL


 


Creatinine  1.3    (0.8-1.3)  mg/dL


 


Est Cr Clr Drug Dosing  62.69    mL/min


 


Estimated GFR (MDRD)  56.9    ml/min


 


Glucose  105    ()  mg/dL


 


POC Glucose   115 H   ()  mg/dL


 


Calcium  8.6    (8.5-10.1)  mg/dL


 


Total Bilirubin  0.9    (0.2-1.0)  mg/dL


 


AST  22    (15-37)  IU/L


 


ALT  42    (14-63)  IU/L


 


Alkaline Phosphatase  103    ()  U/L


 


Troponin I  < 0.050   < 0.050  (0.000-0.056)  ng/mL


 


Total Protein  6.7    (6.4-8.2)  g/dL


 


Albumin  3.5    (3.4-5.0)  g/dL


 


Globulin  3.2    (2.0-3.5)  g/dL


 


Albumin/Globulin Ratio  1.1 L    (1.3-2.8)  


 


Amylase  39    ()  U/L


 


Lipase  153    ()  U/L


 


Urine Color     


 


Urine Appearance     


 


Urine pH     (5.0-8.0)  


 


Ur Specific Gravity     (1.001-1.035)  


 


Urine Protein     (NEGATIVE)  mg/dL


 


Urine Glucose (UA)     (NEGATIVE)  mg/dL


 


Urine Ketones     (NEGATIVE)  mg/dL


 


Urine Occult Blood     (NEGATIVE)  


 


Urine Nitrite     (NEGATIVE)  


 


Urine Bilirubin     (NEGATIVE)  


 


Urine Urobilinogen     (<2.0)  EU/dL


 


Ur Leukocyte Esterase     (NEGATIVE)  


 


Urine RBC     (0-2/HPF)  


 


Urine WBC     (0-5/HPF)  


 


Ur Epithelial Cells     (NONE-FEW)  


 


Amorphous Sediment     (NEGATIVE)  


 


Urine Bacteria     (NEGATIVE)  


 


Urine Mucus     (NONE-MOD)  














  05/29/18 05/29/18 05/30/18 Range/Units





  18:57 22:47 00:29 


 


WBC     (4.0-11.0)  K/uL


 


RBC     (4.50-5.90)  M/uL


 


Hgb     (13.0-17.0)  g/dL


 


Hct     (38.0-50.0)  %


 


MCV     (80.0-98.0)  fL


 


MCH     (27.0-32.0)  pg


 


MCHC     (31.0-37.0)  g/dL


 


RDW Std Deviation     (28.0-62.0)  fl


 


RDW Coeff of Martha     (11.0-15.0)  %


 


Plt Count     (150-400)  K/uL


 


MPV     (7.40-12.00)  fL


 


Neut % (Auto)     (48.0-80.0)  %


 


Lymph % (Auto)     (16.0-40.0)  %


 


Mono % (Auto)     (0.0-15.0)  %


 


Eos % (Auto)     (0.0-7.0)  %


 


Baso % (Auto)     (0.0-1.5)  %


 


Neut # (Auto)     (1.4-5.7)  K/uL


 


Lymph # (Auto)     (0.6-2.4)  K/uL


 


Mono # (Auto)     (0.0-0.8)  K/uL


 


Eos # (Auto)     (0.0-0.7)  K/uL


 


Baso # (Auto)     (0.0-0.1)  K/uL


 


Nucleated RBC %     /100WBC


 


Nucleated RBCs #     K/uL


 


INR     


 


APTT  28.2   62.9 H  (18.6-31.3)  SEC


 


Sodium     (136-148)  mmol/L


 


Potassium     (3.5-5.1)  mmol/L


 


Chloride     ()  mmol/L


 


Carbon Dioxide     (21.0-32.0)  mmol/L


 


BUN     (7.0-18.0)  mg/dL


 


Creatinine     (0.8-1.3)  mg/dL


 


Est Cr Clr Drug Dosing     mL/min


 


Estimated GFR (MDRD)     ml/min


 


Glucose     ()  mg/dL


 


POC Glucose     ()  mg/dL


 


Calcium     (8.5-10.1)  mg/dL


 


Total Bilirubin     (0.2-1.0)  mg/dL


 


AST     (15-37)  IU/L


 


ALT     (14-63)  IU/L


 


Alkaline Phosphatase     ()  U/L


 


Troponin I   < 0.050   (0.000-0.056)  ng/mL


 


Total Protein     (6.4-8.2)  g/dL


 


Albumin     (3.4-5.0)  g/dL


 


Globulin     (2.0-3.5)  g/dL


 


Albumin/Globulin Ratio     (1.3-2.8)  


 


Amylase     ()  U/L


 


Lipase     ()  U/L


 


Urine Color     


 


Urine Appearance     


 


Urine pH     (5.0-8.0)  


 


Ur Specific Gravity     (1.001-1.035)  


 


Urine Protein     (NEGATIVE)  mg/dL


 


Urine Glucose (UA)     (NEGATIVE)  mg/dL


 


Urine Ketones     (NEGATIVE)  mg/dL


 


Urine Occult Blood     (NEGATIVE)  


 


Urine Nitrite     (NEGATIVE)  


 


Urine Bilirubin     (NEGATIVE)  


 


Urine Urobilinogen     (<2.0)  EU/dL


 


Ur Leukocyte Esterase     (NEGATIVE)  


 


Urine RBC     (0-2/HPF)  


 


Urine WBC     (0-5/HPF)  


 


Ur Epithelial Cells     (NONE-FEW)  


 


Amorphous Sediment     (NEGATIVE)  


 


Urine Bacteria     (NEGATIVE)  


 


Urine Mucus     (NONE-MOD)  














  05/30/18 Range/Units





  07:13 


 


WBC   (4.0-11.0)  K/uL


 


RBC   (4.50-5.90)  M/uL


 


Hgb   (13.0-17.0)  g/dL


 


Hct   (38.0-50.0)  %


 


MCV   (80.0-98.0)  fL


 


MCH   (27.0-32.0)  pg


 


MCHC   (31.0-37.0)  g/dL


 


RDW Std Deviation   (28.0-62.0)  fl


 


RDW Coeff of Martha   (11.0-15.0)  %


 


Plt Count   (150-400)  K/uL


 


MPV   (7.40-12.00)  fL


 


Neut % (Auto)   (48.0-80.0)  %


 


Lymph % (Auto)   (16.0-40.0)  %


 


Mono % (Auto)   (0.0-15.0)  %


 


Eos % (Auto)   (0.0-7.0)  %


 


Baso % (Auto)   (0.0-1.5)  %


 


Neut # (Auto)   (1.4-5.7)  K/uL


 


Lymph # (Auto)   (0.6-2.4)  K/uL


 


Mono # (Auto)   (0.0-0.8)  K/uL


 


Eos # (Auto)   (0.0-0.7)  K/uL


 


Baso # (Auto)   (0.0-0.1)  K/uL


 


Nucleated RBC %   /100WBC


 


Nucleated RBCs #   K/uL


 


INR   


 


APTT  45.2 H  (18.6-31.3)  SEC


 


Sodium   (136-148)  mmol/L


 


Potassium   (3.5-5.1)  mmol/L


 


Chloride   ()  mmol/L


 


Carbon Dioxide   (21.0-32.0)  mmol/L


 


BUN   (7.0-18.0)  mg/dL


 


Creatinine   (0.8-1.3)  mg/dL


 


Est Cr Clr Drug Dosing   mL/min


 


Estimated GFR (MDRD)   ml/min


 


Glucose   ()  mg/dL


 


POC Glucose   ()  mg/dL


 


Calcium   (8.5-10.1)  mg/dL


 


Total Bilirubin   (0.2-1.0)  mg/dL


 


AST   (15-37)  IU/L


 


ALT   (14-63)  IU/L


 


Alkaline Phosphatase   ()  U/L


 


Troponin I   (0.000-0.056)  ng/mL


 


Total Protein   (6.4-8.2)  g/dL


 


Albumin   (3.4-5.0)  g/dL


 


Globulin   (2.0-3.5)  g/dL


 


Albumin/Globulin Ratio   (1.3-2.8)  


 


Amylase   ()  U/L


 


Lipase   ()  U/L


 


Urine Color   


 


Urine Appearance   


 


Urine pH   (5.0-8.0)  


 


Ur Specific Gravity   (1.001-1.035)  


 


Urine Protein   (NEGATIVE)  mg/dL


 


Urine Glucose (UA)   (NEGATIVE)  mg/dL


 


Urine Ketones   (NEGATIVE)  mg/dL


 


Urine Occult Blood   (NEGATIVE)  


 


Urine Nitrite   (NEGATIVE)  


 


Urine Bilirubin   (NEGATIVE)  


 


Urine Urobilinogen   (<2.0)  EU/dL


 


Ur Leukocyte Esterase   (NEGATIVE)  


 


Urine RBC   (0-2/HPF)  


 


Urine WBC   (0-5/HPF)  


 


Ur Epithelial Cells   (NONE-FEW)  


 


Amorphous Sediment   (NEGATIVE)  


 


Urine Bacteria   (NEGATIVE)  


 


Urine Mucus   (NONE-MOD)  











Med Orders - Current: 


 Current Medications





Acetaminophen (Tylenol)  650 mg PO Q4H PRN


   PRN Reason: Pain (mild 1-3)


   Last Admin: 05/29/18 15:02 Dose:  650 mg


Aspirin (Halfprin)  81 mg PO DAILY [facility]


   Last Admin: 05/30/18 01:31 Dose:  81 mg


Atorvastatin Calcium (Lipitor)  40 mg PO Q24H [facility]


   Last Admin: 05/30/18 01:32 Dose:  40 mg


Clopidogrel Bisulfate (Plavix)  75 mg PO DAILY [facility]


   Last Admin: 05/30/18 01:30 Dose:  75 mg


Ezetimibe (Zetia)  10 mg PO Q24H [facility]


   Last Admin: 05/30/18 01:31 Dose:  10 mg


Furosemide (Lasix)  20 mg PO Q24H [facility]


   Last Admin: 05/30/18 01:32 Dose:  20 mg


Heparin Sodium (Porcine) (Heparin Sodium)  1,000 - 2,000 units IV Q6H PRN


   PRN Reason: PER HEPARIN PROTOCOL aPTT


Heparin Sod,Pork In 0.45% Nacl (Heparin-1/2ns 25,000 Units/500)  25,000 unit in 

500 mls @ 20 mls/hr IV TITRATE [facility]; Protocol


   Last Admin: 05/29/18 21:06 Dose:  1,000 units/hr, 20 mls/hr


Levothyroxine Sodium (Synthroid)  88 mcg PO ACBREAKFAST [facility]


   Last Admin: 05/30/18 06:57 Dose:  Not Given


Metoprolol Succinate (Toprol Xl)  12.5 mg PO Q24H [facility]


   Last Admin: 05/30/18 01:29 Dose:  12.5 mg


Non-Formulary Medication (Patient's Own Medication)  1 tab PO DAILY [facility]


Ondansetron HCl (Zofran Odt)  4 mg PO Q4H PRN


   PRN Reason: nausea, able to take PO


Potassium Chloride (Klor-Con 10)  10 meq PO BID@0200,1500 [facility]


Sodium Chloride (Saline Flush)  10 ml FLUSH ASDIRECTED PRN


   PRN Reason: Keep Vein Open


   Last Admin: 05/29/18 10:38 Dose:  10 ml


Sodium Chloride (Saline Flush)  2.5 ml FLUSH ASDIRECTED PRN


   PRN Reason: Keep Vein Open


   Last Admin: 05/29/18 10:38 Dose:  2.5 ml





Discontinued Medications





Enoxaparin Sodium (Lovenox)  40 mg SUBCUT Q24H [facility]


   Last Admin: 05/29/18 13:57 Dose:  40 mg


Heparin Sodium (Porcine) (Heparin Sodium)  4,000 units IVPUSH ONETIME ONE


   Stop: 05/29/18 18:30


   Last Admin: 05/29/18 21:02 Dose:  4,000 units


Sodium Chloride (Normal Saline)  1,000 mls @ 999 mls/hr IV STAT ONE


   Stop: 05/29/18 11:33


   Last Admin: 05/29/18 10:37 Dose:  999 mls/hr


Labetalol HCl (Normodyne)  20 mg IVPUSH NOW ONE; Protocol


   Stop: 05/29/18 11:02


   Last Admin: 05/29/18 11:07 Dose:  20 mg


Nitroglycerin (Nitrostat) Confirm Administered Dose 0.4 mg .ROUTE .STK-MED ONE


   Stop: 05/29/18 10:34


   Last Admin: 05/29/18 10:43 Dose:  Not Given


Nitroglycerin (Nitrostat)  0.4 mg SL Q5M PRN


   PRN Reason: Chest Pain


   Last Admin: 05/29/18 10:46 Dose:  0.4 mg


Nitroglycerin (Nitro-Bid 2%)  1 gm TOP ONETIME ONE


   Stop: 05/29/18 10:53


   Last Admin: 05/29/18 11:00 Dose:  1 gm


Potassium Chloride (Klor-Con 10)  10 meq PO BID [place]


   Last Admin: 05/30/18 01:30 Dose:  10 meq











- Exam


General: Reports: Alert, Oriented, Cooperative, No Acute Distress


Neck: Reports: Supple


Lungs: Reports: Clear to Auscultation, Normal Respiratory Effort


Cardiovascular: Reports: Regular Rate, Regular Rhythm, No Murmurs


GI/Abdominal Exam: Normal Bowel Sounds, Soft, Non-Tender, No Organomegaly


Back Exam: Reports: Normal Inspection, Full Range of Motion


Extremities: Normal Inspection, Normal Range of Motion, Non-Tender, No Pedal 

Edema, Normal Capillary Refill


Neurological: Reports: No New Focal Deficit


Psy/Mental Status: Reports: Alert, Normal Affect, Normal Mood

## 2018-06-01 NOTE — ECHO
The echocardiogram report can be seen in this patient's EMR (Electronic Medical 
Record) in the Reports section.  The echocardiogram report has also been 
scanned into PACS and can be seen there.
JOHN